# Patient Record
(demographics unavailable — no encounter records)

---

## 2024-11-18 NOTE — RISK ASSESSMENT
[Clinical Interview] : Clinical Interview [Collateral Sources] : Collateral Sources [No] : No [No known suicide factors] : No known suicide factors [Non-compliant or not receiving treatment] : non-compliant or not receiving treatment [None known] : None known [Identifies reasons for living] : identifies reasons for living [Supportive social network of family or friends] : supportive social network of family or friends [Fear of death/actual act of killing self] : fear of death or the actual act of killing self [Engaged in work or school] : engaged in work or school [None in the patient's lifetime] : None in the patient's lifetime [Yes] : yes [None Known] : none known [No known risk factors] : No known risk factors [Residential stability] : residential stability [Affective stability] : affective stability [Sobriety] : sobriety [de-identified] : Patient does not have access to lethal means/weapons

## 2024-11-18 NOTE — HISTORY OF PRESENT ILLNESS
[Not Applicable] : Not applicable [FreeTextEntry1] : Patient is a 8y/o female, domiciled with mother, father, 14y/o sister, and 8y/o sister, currently enrolled at [employer], 2nd grade general education, receives speech therapy, no formal PPH, not currently in outpatient treatment, no prior psychiatric hospitalization, no self-injury or suicide attempts, no aggression/violence, no substance use, no legal issues, no CPS involvement, no trauma/abuse, no PMH, presenting today with mother who was referred by school for inattention and distractibility.  LMHC met with patient who was polite and cooperative. Patient reports mood as good. Patient reports academic difficulties, including difficulty focusing, maintaining attention, and proneness to distraction and boredom. She also reports struggling with reading and, at times, math, expressing discomfort with reading aloud due to fear of mispronouncing words and subsequent embarrassment. Additionally, patient reports she forgets things easily. School-related worries, such as math homework, were also noted. Patient reports having friends at school and positive peer relationships, denying any bullying. She described positive relationships at home and enjoyment in playing with her sisters. The patient denied suicidal ideation and self-injurious behaviors. No other concerns expressed.   LM DO obtained collateral information from patient's mother. Writer conducted this session in mom's primary language of English. Writer is bilingual. Mom reports the patient reads at home but refuses to read in class due to embarrassment. She presents with significant distractibility and inattention across settings. Mom reports patient experiences difficulty reading at school. Mom reports patient can read at home but refuses to read to the teachers at school because she is embarrassed to pronounce words wrong. At home, although previously exhibiting non-compliance, she is now generally cooperative. However, distractibility, difficulty completing chores, and forgetfulness (including forgetting tasks, letters, and numbers) persist. Mom reports needing to provide repeated instructions, a pattern present since early childhood. The patient also exhibits difficulty sustaining attention to single tasks, frequently jumping between activities, becoming easily frustrated, and expressing boredom. She reports good sleep quality but struggles with sleep onset at times. Mom reports good appetite. She denies suicidal ideation and self-injurious behaviors. Denies acute safety concerns.   [FreeTextEntry2] : see HPI [FreeTextEntry3] : None

## 2024-11-18 NOTE — RISK ASSESSMENT
[Clinical Interview] : Clinical Interview [Collateral Sources] : Collateral Sources [No] : No [No known suicide factors] : No known suicide factors [Non-compliant or not receiving treatment] : non-compliant or not receiving treatment [None known] : None known [Identifies reasons for living] : identifies reasons for living [Supportive social network of family or friends] : supportive social network of family or friends [Fear of death/actual act of killing self] : fear of death or the actual act of killing self [Engaged in work or school] : engaged in work or school [None in the patient's lifetime] : None in the patient's lifetime [Yes] : yes [None Known] : none known [No known risk factors] : No known risk factors [Residential stability] : residential stability [Affective stability] : affective stability [Sobriety] : sobriety [de-identified] : Patient does not have access to lethal means/weapons

## 2024-11-18 NOTE — PHYSICAL EXAM
[Cooperative] : cooperative [Euthymic] : euthymic [Full] : full [Clear] : clear [Linear/Goal Directed] : linear/goal directed [Average] : average [WNL] : within normal limits [Positive interaction] : positive interaction [Unremarkable/age appropriate] : unremarkable/age appropriate [Normal] : normal [Anxious] : anxious [None] : none [None Reported] : none reported

## 2024-11-18 NOTE — DISCUSSION/SUMMARY
[Low acute suicide risk] : Low acute suicide risk [No] : No [Not clinically indicated] : Safety Plan completed/updated (for individuals at risk): Not clinically indicated [FreeTextEntry1] : At present, patient has a low risk of harm to self.  Although patient has risk factors including inattention, distractibility, patient has significant protective factors including strong family/social support, domiciled, age, lack of prior self-harm, no suicide attempts, no substance use, no néstor, no psychosis, no CAH, no psychiatric hospitalization, current willingness to engage in treatment, future orientation with long & short term goals for the future, hopeful, help-seeking, engaged in school & activities, current denial of any SIIP or urges to self-harm, no reported hx of abuse/trauma, no aggression/violence, no access to guns/family is able to means restrict, no legal history.

## 2024-11-18 NOTE — REASON FOR VISIT
[Behavioral Health Urgent Care Assessment] : a behavioral health urgent care assessment [School] : school [Patient] : patient [Self] : alone [Mother] : with mother [TextBox_17] : inattention and distractibility

## 2024-11-18 NOTE — PLAN
[TextBox_9] : Mom was provided with Saskia scales. Patient will benefit from following up with peds neuro for further assessment of ADHD. Also instructed to bring psychoed testing to peds neuro, if complete.  [TextBox_11] :  no clinical indication for pharmacotherapy at this time [TextBox_13] : not clinically indicated  [TextBox_26] : School referral/did not sign consent